# Patient Record
Sex: FEMALE | Race: OTHER | NOT HISPANIC OR LATINO
[De-identification: names, ages, dates, MRNs, and addresses within clinical notes are randomized per-mention and may not be internally consistent; named-entity substitution may affect disease eponyms.]

---

## 2024-01-13 ENCOUNTER — NON-APPOINTMENT (OUTPATIENT)
Age: 44
End: 2024-01-13

## 2024-01-17 PROBLEM — Z00.00 ENCOUNTER FOR PREVENTIVE HEALTH EXAMINATION: Status: ACTIVE | Noted: 2024-01-17

## 2024-01-18 ENCOUNTER — NON-APPOINTMENT (OUTPATIENT)
Age: 44
End: 2024-01-18

## 2024-01-19 ENCOUNTER — NON-APPOINTMENT (OUTPATIENT)
Age: 44
End: 2024-01-19

## 2024-01-19 ENCOUNTER — TRANSCRIPTION ENCOUNTER (OUTPATIENT)
Age: 44
End: 2024-01-19

## 2024-01-19 ENCOUNTER — APPOINTMENT (OUTPATIENT)
Dept: OPHTHALMOLOGY | Facility: CLINIC | Age: 44
End: 2024-01-19
Payer: SELF-PAY

## 2024-01-19 PROCEDURE — 92133 CPTRZD OPH DX IMG PST SGM ON: CPT

## 2024-01-19 PROCEDURE — 92083 EXTENDED VISUAL FIELD XM: CPT

## 2024-01-19 PROCEDURE — 92004 COMPRE OPH EXAM NEW PT 1/>: CPT

## 2024-02-08 ENCOUNTER — APPOINTMENT (OUTPATIENT)
Dept: NEUROSURGERY | Facility: CLINIC | Age: 44
End: 2024-02-08
Payer: SELF-PAY

## 2024-02-08 VITALS
HEART RATE: 70 BPM | HEIGHT: 65.75 IN | DIASTOLIC BLOOD PRESSURE: 86 MMHG | SYSTOLIC BLOOD PRESSURE: 129 MMHG | RESPIRATION RATE: 18 BRPM | BODY MASS INDEX: 38.01 KG/M2 | OXYGEN SATURATION: 98 % | WEIGHT: 233.69 LBS

## 2024-02-08 DIAGNOSIS — Z78.9 OTHER SPECIFIED HEALTH STATUS: ICD-10-CM

## 2024-02-08 DIAGNOSIS — Z01.818 ENCOUNTER FOR OTHER PREPROCEDURAL EXAMINATION: ICD-10-CM

## 2024-02-08 PROCEDURE — 99204 OFFICE O/P NEW MOD 45 MIN: CPT

## 2024-02-08 RX ORDER — METOPROLOL TARTRATE 75 MG/1
TABLET, FILM COATED ORAL
Refills: 0 | Status: ACTIVE | COMMUNITY

## 2024-02-08 NOTE — REASON FOR VISIT
[Consultation] : a consultation visit [Referred By: _________] : Patient was referred by GAGAN [FreeTextEntry1] : Papilledema

## 2024-02-08 NOTE — ASSESSMENT
[FreeTextEntry1] : MRI brain without contrast from 12/27/23 - report reviewed; unable to see all images. Advised patient to obtain disc to bring to office. Given evidence of papilledema on exam, we discussed this is likely due to intracranial HTN. We discussed various treatment options including medications, stent placement vs  shunt placement. Prior to finalizing treatment plan, recommend further diagnostic workup Recommend MRV without contrast to evaluate for venous sinus stenosis Also recommend lumbar puncture to evaluate opening pressure  After diagnostic studies complete, RTO to review results and discuss treatment options  Patient verbalizes agreement and understanding with plan of care.  I, Dr. Diaz, personally performed the evaluation and management (E/M) services for this new patient.  That E/M includes conducting the initial examination, assessing all conditions, and establishing the plan of care.  Today, my ACP, Coby Cornejo, was here to observe my evaluation and management services for this patient to be followed going forward.

## 2024-02-08 NOTE — HISTORY OF PRESENT ILLNESS
[de-identified] : 43 year old woman referred by Dr. Jefferson Guerrero for papilledema.  She had routine eye check in [place] over Christmas and was noted to have optic nerve edema bilaterally.  MRI brain and orbits from 12/27/23 showed partially empty sella. She reports increased headache over the past year which was improving with Beta Blocker medication as prescribed by PCP.  She was found to have papilledema and presents today for further discussion of treatment recommendations.  Denies changes in vision.

## 2024-02-12 ENCOUNTER — APPOINTMENT (OUTPATIENT)
Dept: NEUROSURGERY | Facility: CLINIC | Age: 44
End: 2024-02-12

## 2024-02-13 ENCOUNTER — APPOINTMENT (OUTPATIENT)
Dept: OBGYN | Facility: CLINIC | Age: 44
End: 2024-02-13

## 2024-03-09 LAB
ANION GAP SERPL CALC-SCNC: 12 MMOL/L
BUN SERPL-MCNC: 12 MG/DL
CALCIUM SERPL-MCNC: 9.4 MG/DL
CHLORIDE SERPL-SCNC: 105 MMOL/L
CO2 SERPL-SCNC: 23 MMOL/L
CREAT SERPL-MCNC: 0.69 MG/DL
EGFR: 109 ML/MIN/1.73M2
GLUCOSE SERPL-MCNC: 100 MG/DL
HCT VFR BLD CALC: 42.6 %
HGB BLD-MCNC: 13.9 G/DL
MCHC RBC-ENTMCNC: 29.1 PG
MCHC RBC-ENTMCNC: 32.6 GM/DL
MCV RBC AUTO: 89.3 FL
PLATELET # BLD AUTO: 259 K/UL
POTASSIUM SERPL-SCNC: 4.1 MMOL/L
RBC # BLD: 4.77 M/UL
RBC # FLD: 13.8 %
SODIUM SERPL-SCNC: 140 MMOL/L
WBC # FLD AUTO: 8.44 K/UL

## 2024-03-10 LAB
APTT BLD: 26.2 SEC
INR PPP: 0.87 RATIO
PT BLD: 9.9 SEC

## 2024-03-11 ENCOUNTER — APPOINTMENT (OUTPATIENT)
Dept: MRI IMAGING | Facility: HOSPITAL | Age: 44
End: 2024-03-11

## 2024-03-11 ENCOUNTER — RESULT REVIEW (OUTPATIENT)
Age: 44
End: 2024-03-11

## 2024-03-11 ENCOUNTER — OUTPATIENT (OUTPATIENT)
Dept: OUTPATIENT SERVICES | Facility: HOSPITAL | Age: 44
LOS: 1 days | End: 2024-03-11
Payer: COMMERCIAL

## 2024-03-11 PROCEDURE — 70544 MR ANGIOGRAPHY HEAD W/O DYE: CPT

## 2024-03-11 PROCEDURE — 70544 MR ANGIOGRAPHY HEAD W/O DYE: CPT | Mod: 26

## 2024-03-12 ENCOUNTER — OUTPATIENT (OUTPATIENT)
Dept: OUTPATIENT SERVICES | Facility: HOSPITAL | Age: 44
LOS: 1 days | End: 2024-03-12
Payer: COMMERCIAL

## 2024-03-12 ENCOUNTER — APPOINTMENT (OUTPATIENT)
Dept: INTERVENTIONAL RADIOLOGY/VASCULAR | Facility: HOSPITAL | Age: 44
End: 2024-03-12

## 2024-03-12 ENCOUNTER — RESULT REVIEW (OUTPATIENT)
Age: 44
End: 2024-03-12

## 2024-03-12 PROCEDURE — 62328 DX LMBR SPI PNXR W/FLUOR/CT: CPT

## 2024-03-12 PROCEDURE — 99152 MOD SED SAME PHYS/QHP 5/>YRS: CPT

## 2024-03-22 ENCOUNTER — NON-APPOINTMENT (OUTPATIENT)
Age: 44
End: 2024-03-22

## 2024-03-22 ENCOUNTER — APPOINTMENT (OUTPATIENT)
Dept: OPHTHALMOLOGY | Facility: CLINIC | Age: 44
End: 2024-03-22
Payer: SELF-PAY

## 2024-03-22 PROCEDURE — 92133 CPTRZD OPH DX IMG PST SGM ON: CPT

## 2024-03-22 PROCEDURE — 92014 COMPRE OPH EXAM EST PT 1/>: CPT

## 2024-03-22 PROCEDURE — 92083 EXTENDED VISUAL FIELD XM: CPT

## 2024-03-25 ENCOUNTER — APPOINTMENT (OUTPATIENT)
Dept: NEUROSURGERY | Facility: CLINIC | Age: 44
End: 2024-03-25
Payer: SELF-PAY

## 2024-03-25 VITALS
OXYGEN SATURATION: 98 % | WEIGHT: 103 LBS | DIASTOLIC BLOOD PRESSURE: 84 MMHG | RESPIRATION RATE: 18 BRPM | TEMPERATURE: 97.6 F | BODY MASS INDEX: 16.95 KG/M2 | SYSTOLIC BLOOD PRESSURE: 127 MMHG | HEART RATE: 73 BPM | HEIGHT: 65.5 IN

## 2024-03-25 DIAGNOSIS — G93.2 BENIGN INTRACRANIAL HYPERTENSION: ICD-10-CM

## 2024-03-25 PROCEDURE — 99214 OFFICE O/P EST MOD 30 MIN: CPT

## 2024-03-25 RX ORDER — ACETAZOLAMIDE 500 MG/1
CAPSULE, EXTENDED RELEASE ORAL
Refills: 0 | Status: ACTIVE | COMMUNITY

## 2024-03-26 NOTE — ASSESSMENT
[FreeTextEntry1] : MRV brain without contrast from 3/11/24 reviewed which demonstrates empty sella and luminal narrowing of the transverse sinuses. LP opening pressure [id number].  Continue follow up with Dr. Guerrero, currently on Diamox.  Return to Dr. Diaz as needed for any worsening symptoms.  Patient verbalizes agreement and understanding with plan of care.  I, Dr. Diaz, personally performed the evaluation and management (E/M) services for this new patient.  That E/M includes conducting the initial examination, assessing all conditions, and establishing the plan of care.  Today, my ACP, Lida Esteves, was here to observe my evaluation and management services for this patient to be followed going forward.

## 2024-03-26 NOTE — PHYSICAL EXAM
[General Appearance - Alert] : alert [General Appearance - In No Acute Distress] : in no acute distress [Oriented To Time, Place, And Person] : oriented to person, place, and time [Sclera] : the sclera and conjunctiva were normal [Outer Ear] : the ears and nose were normal in appearance [] : no respiratory distress [Neck Appearance] : the appearance of the neck was normal [Abnormal Walk] : normal gait [Skin Color & Pigmentation] : normal skin color and pigmentation

## 2024-03-26 NOTE — HISTORY OF PRESENT ILLNESS
[de-identified] : 43 year old woman referred by Dr. Jefferson Guerrero for papilledema.  She had routine eye check in [place] over [place] and was noted to have optic nerve edema bilaterally.  MRI brain and orbits from 12/27/23 showed partially empty sella. She reports increased headache over the past year which was improving with Beta Blocker medication as prescribed by PCP.  Denies changes in vision.  She is s/p LP which documented an opening pressure of [id number] and MRV with luminal narrowing of transverse sinuses and empty sella. She comes in today to discuss these results and for treatment recommendations. Reports headaches are improved.

## 2024-03-26 NOTE — DATA REVIEWED
[de-identified] : I have reviewed the most recent MRI  3/11/24 at [facility] which shows Luminal narrowing/stenosis of the transverse sinuses, partly empty sella and flattening of the posterior sclera, findings which can be seen with idiopathic intracranial hypertension.  No occlusion within the dural venous sinuses.

## 2024-05-24 ENCOUNTER — NON-APPOINTMENT (OUTPATIENT)
Age: 44
End: 2024-05-24

## 2024-05-24 ENCOUNTER — APPOINTMENT (OUTPATIENT)
Dept: OPHTHALMOLOGY | Facility: CLINIC | Age: 44
End: 2024-05-24
Payer: SELF-PAY

## 2024-05-24 PROCEDURE — 92083 EXTENDED VISUAL FIELD XM: CPT

## 2024-05-24 PROCEDURE — 92133 CPTRZD OPH DX IMG PST SGM ON: CPT

## 2024-05-24 PROCEDURE — 92014 COMPRE OPH EXAM EST PT 1/>: CPT

## 2024-09-20 ENCOUNTER — NON-APPOINTMENT (OUTPATIENT)
Age: 44
End: 2024-09-20

## 2024-09-20 ENCOUNTER — APPOINTMENT (OUTPATIENT)
Dept: OPHTHALMOLOGY | Facility: CLINIC | Age: 44
End: 2024-09-20
Payer: SELF-PAY

## 2024-09-20 PROCEDURE — 92083 EXTENDED VISUAL FIELD XM: CPT

## 2024-09-20 PROCEDURE — 92014 COMPRE OPH EXAM EST PT 1/>: CPT

## 2024-09-20 PROCEDURE — 92133 CPTRZD OPH DX IMG PST SGM ON: CPT

## 2024-11-15 ENCOUNTER — NON-APPOINTMENT (OUTPATIENT)
Age: 44
End: 2024-11-15

## 2024-11-15 ENCOUNTER — APPOINTMENT (OUTPATIENT)
Dept: OPHTHALMOLOGY | Facility: CLINIC | Age: 44
End: 2024-11-15
Payer: SELF-PAY

## 2024-11-15 PROCEDURE — 92133 CPTRZD OPH DX IMG PST SGM ON: CPT

## 2024-11-15 PROCEDURE — 92014 COMPRE OPH EXAM EST PT 1/>: CPT

## 2024-11-15 PROCEDURE — 92083 EXTENDED VISUAL FIELD XM: CPT

## 2025-02-21 ENCOUNTER — APPOINTMENT (OUTPATIENT)
Dept: OPHTHALMOLOGY | Facility: CLINIC | Age: 45
End: 2025-02-21

## 2025-02-28 ENCOUNTER — APPOINTMENT (OUTPATIENT)
Dept: OPHTHALMOLOGY | Facility: CLINIC | Age: 45
End: 2025-02-28
Payer: SELF-PAY

## 2025-02-28 ENCOUNTER — NON-APPOINTMENT (OUTPATIENT)
Age: 45
End: 2025-02-28

## 2025-02-28 PROCEDURE — 92133 CPTRZD OPH DX IMG PST SGM ON: CPT

## 2025-02-28 PROCEDURE — 92083 EXTENDED VISUAL FIELD XM: CPT

## 2025-02-28 PROCEDURE — 92014 COMPRE OPH EXAM EST PT 1/>: CPT

## 2025-06-27 ENCOUNTER — NON-APPOINTMENT (OUTPATIENT)
Age: 45
End: 2025-06-27

## 2025-06-27 ENCOUNTER — APPOINTMENT (OUTPATIENT)
Dept: OPHTHALMOLOGY | Facility: CLINIC | Age: 45
End: 2025-06-27
Payer: SELF-PAY

## 2025-06-27 PROCEDURE — 92014 COMPRE OPH EXAM EST PT 1/>: CPT

## 2025-06-27 PROCEDURE — 92083 EXTENDED VISUAL FIELD XM: CPT

## 2025-06-27 PROCEDURE — 92133 CPTRZD OPH DX IMG PST SGM ON: CPT

## 2025-09-05 ENCOUNTER — APPOINTMENT (OUTPATIENT)
Dept: OPHTHALMOLOGY | Facility: CLINIC | Age: 45
End: 2025-09-05
Payer: SELF-PAY

## 2025-09-05 ENCOUNTER — NON-APPOINTMENT (OUTPATIENT)
Age: 45
End: 2025-09-05

## 2025-09-05 PROCEDURE — 92014 COMPRE OPH EXAM EST PT 1/>: CPT
